# Patient Record
Sex: FEMALE | Race: OTHER | HISPANIC OR LATINO | Employment: UNEMPLOYED | URBAN - METROPOLITAN AREA
[De-identification: names, ages, dates, MRNs, and addresses within clinical notes are randomized per-mention and may not be internally consistent; named-entity substitution may affect disease eponyms.]

---

## 2020-09-18 ENCOUNTER — APPOINTMENT (EMERGENCY)
Dept: RADIOLOGY | Facility: HOSPITAL | Age: 45
End: 2020-09-18

## 2020-09-18 ENCOUNTER — HOSPITAL ENCOUNTER (EMERGENCY)
Facility: HOSPITAL | Age: 45
Discharge: HOME | End: 2020-09-18
Attending: EMERGENCY MEDICINE | Admitting: EMERGENCY MEDICINE

## 2020-09-18 VITALS
RESPIRATION RATE: 14 BRPM | SYSTOLIC BLOOD PRESSURE: 112 MMHG | WEIGHT: 149.25 LBS | TEMPERATURE: 96.6 F | DIASTOLIC BLOOD PRESSURE: 72 MMHG | HEART RATE: 68 BPM | OXYGEN SATURATION: 100 %

## 2020-09-18 DIAGNOSIS — R07.9 CHEST PAIN, UNSPECIFIED TYPE: Primary | ICD-10-CM

## 2020-09-18 DIAGNOSIS — Z76.0 MEDICATION REFILL: ICD-10-CM

## 2020-09-18 DIAGNOSIS — I10 BENIGN ESSENTIAL HTN: ICD-10-CM

## 2020-09-18 DIAGNOSIS — R07.1 CHEST PAIN ON BREATHING: ICD-10-CM

## 2020-09-18 LAB
ALBUMIN SERPL BCP-MCNC: 4.4 G/DL (ref 3–5.2)
ALP SERPL-CCNC: 66 U/L (ref 43–122)
ALT SERPL W P-5'-P-CCNC: 19 U/L (ref 9–52)
ANION GAP SERPL CALCULATED.3IONS-SCNC: 8 MMOL/L (ref 5–14)
AST SERPL W P-5'-P-CCNC: 31 U/L (ref 14–36)
ATRIAL RATE: 75 BPM
BASOPHILS # BLD AUTO: 0.1 THOUSANDS/ΜL (ref 0–0.1)
BASOPHILS NFR BLD AUTO: 1 % (ref 0–1)
BILIRUB SERPL-MCNC: 0.4 MG/DL
BUN SERPL-MCNC: 10 MG/DL (ref 5–25)
CALCIUM SERPL-MCNC: 9.3 MG/DL (ref 8.4–10.2)
CHLORIDE SERPL-SCNC: 104 MMOL/L (ref 97–108)
CO2 SERPL-SCNC: 25 MMOL/L (ref 22–30)
CREAT SERPL-MCNC: 0.59 MG/DL (ref 0.6–1.2)
EOSINOPHIL # BLD AUTO: 0.2 THOUSAND/ΜL (ref 0–0.4)
EOSINOPHIL NFR BLD AUTO: 3 % (ref 0–6)
ERYTHROCYTE [DISTWIDTH] IN BLOOD BY AUTOMATED COUNT: 13.3 %
EXT PREG TEST URINE: NEGATIVE
EXT. CONTROL ED NAV: NORMAL
GFR SERPL CREATININE-BSD FRML MDRD: 113 ML/MIN/1.73SQ M
GLUCOSE SERPL-MCNC: 106 MG/DL (ref 70–99)
HCT VFR BLD AUTO: 40.8 % (ref 36–46)
HGB BLD-MCNC: 13.9 G/DL (ref 12–16)
LIPASE SERPL-CCNC: 71 U/L (ref 23–300)
LYMPHOCYTES # BLD AUTO: 2 THOUSANDS/ΜL (ref 0.5–4)
LYMPHOCYTES NFR BLD AUTO: 29 % (ref 25–45)
MCH RBC QN AUTO: 31.8 PG (ref 26–34)
MCHC RBC AUTO-ENTMCNC: 34.1 G/DL (ref 31–36)
MCV RBC AUTO: 93 FL (ref 80–100)
MONOCYTES # BLD AUTO: 0.6 THOUSAND/ΜL (ref 0.2–0.9)
MONOCYTES NFR BLD AUTO: 8 % (ref 1–10)
NEUTROPHILS # BLD AUTO: 4.2 THOUSANDS/ΜL (ref 1.8–7.8)
NEUTS SEG NFR BLD AUTO: 60 % (ref 45–65)
NT-PROBNP SERPL-MCNC: 30.4 PG/ML (ref 0–299)
P AXIS: 107 DEGREES
PLATELET # BLD AUTO: 303 THOUSANDS/UL (ref 150–450)
PMV BLD AUTO: 8.1 FL (ref 8.9–12.7)
POTASSIUM SERPL-SCNC: 4.2 MMOL/L (ref 3.6–5)
PR INTERVAL: 140 MS
PROT SERPL-MCNC: 7.7 G/DL (ref 5.9–8.4)
QRS AXIS: 116 DEGREES
QRSD INTERVAL: 82 MS
QT INTERVAL: 384 MS
QTC INTERVAL: 428 MS
RBC # BLD AUTO: 4.38 MILLION/UL (ref 4–5.2)
SODIUM SERPL-SCNC: 137 MMOL/L (ref 137–147)
T WAVE AXIS: 153 DEGREES
TROPONIN I SERPL-MCNC: <0.01 NG/ML (ref 0–0.03)
VENTRICULAR RATE: 75 BPM
WBC # BLD AUTO: 7 THOUSAND/UL (ref 4.5–11)

## 2020-09-18 PROCEDURE — 93010 ELECTROCARDIOGRAM REPORT: CPT | Performed by: INTERNAL MEDICINE

## 2020-09-18 PROCEDURE — 99285 EMERGENCY DEPT VISIT HI MDM: CPT | Performed by: EMERGENCY MEDICINE

## 2020-09-18 PROCEDURE — 80053 COMPREHEN METABOLIC PANEL: CPT | Performed by: EMERGENCY MEDICINE

## 2020-09-18 PROCEDURE — 80061 LIPID PANEL: CPT

## 2020-09-18 PROCEDURE — 81025 URINE PREGNANCY TEST: CPT | Performed by: EMERGENCY MEDICINE

## 2020-09-18 PROCEDURE — 84484 ASSAY OF TROPONIN QUANT: CPT | Performed by: EMERGENCY MEDICINE

## 2020-09-18 PROCEDURE — 83690 ASSAY OF LIPASE: CPT | Performed by: EMERGENCY MEDICINE

## 2020-09-18 PROCEDURE — 99285 EMERGENCY DEPT VISIT HI MDM: CPT

## 2020-09-18 PROCEDURE — 71045 X-RAY EXAM CHEST 1 VIEW: CPT

## 2020-09-18 PROCEDURE — 83880 ASSAY OF NATRIURETIC PEPTIDE: CPT | Performed by: EMERGENCY MEDICINE

## 2020-09-18 PROCEDURE — 96360 HYDRATION IV INFUSION INIT: CPT

## 2020-09-18 PROCEDURE — 93005 ELECTROCARDIOGRAM TRACING: CPT

## 2020-09-18 PROCEDURE — 36415 COLL VENOUS BLD VENIPUNCTURE: CPT | Performed by: EMERGENCY MEDICINE

## 2020-09-18 PROCEDURE — 85025 COMPLETE CBC W/AUTO DIFF WBC: CPT | Performed by: EMERGENCY MEDICINE

## 2020-09-18 RX ORDER — ASPIRIN 81 MG/1
324 TABLET, CHEWABLE ORAL ONCE
Status: DISCONTINUED | OUTPATIENT
Start: 2020-09-18 | End: 2020-09-18

## 2020-09-18 RX ORDER — NIFEDIPINE 30 MG/1
30 TABLET, FILM COATED, EXTENDED RELEASE ORAL DAILY
COMMUNITY
End: 2020-09-21 | Stop reason: ALTCHOICE

## 2020-09-18 RX ORDER — METOCLOPRAMIDE 10 MG/1
10 TABLET ORAL 4 TIMES DAILY
COMMUNITY

## 2020-09-18 RX ORDER — OMEPRAZOLE 20 MG/1
20 CAPSULE, DELAYED RELEASE ORAL DAILY
COMMUNITY
End: 2020-09-18 | Stop reason: SDUPTHER

## 2020-09-18 RX ORDER — OMEPRAZOLE 20 MG/1
20 CAPSULE, DELAYED RELEASE ORAL DAILY
Qty: 45 CAPSULE | Refills: 0 | Status: SHIPPED | OUTPATIENT
Start: 2020-09-18

## 2020-09-18 RX ORDER — TELMISARTAN AND HYDROCHLORTHIAZIDE 80; 12.5 MG/1; MG/1
1 TABLET ORAL DAILY
Qty: 14 TABLET | Refills: 0 | Status: SHIPPED | OUTPATIENT
Start: 2020-09-18 | End: 2020-09-30 | Stop reason: SDUPTHER

## 2020-09-18 RX ADMIN — SODIUM CHLORIDE 1000 ML: 0.9 INJECTION, SOLUTION INTRAVENOUS at 11:03

## 2020-09-21 ENCOUNTER — OFFICE VISIT (OUTPATIENT)
Dept: FAMILY MEDICINE CLINIC | Facility: CLINIC | Age: 45
End: 2020-09-21

## 2020-09-21 VITALS
WEIGHT: 148 LBS | HEIGHT: 61 IN | DIASTOLIC BLOOD PRESSURE: 60 MMHG | SYSTOLIC BLOOD PRESSURE: 106 MMHG | RESPIRATION RATE: 16 BRPM | HEART RATE: 75 BPM | BODY MASS INDEX: 27.94 KG/M2 | TEMPERATURE: 97.3 F | OXYGEN SATURATION: 98 %

## 2020-09-21 DIAGNOSIS — I10 BENIGN ESSENTIAL HTN: ICD-10-CM

## 2020-09-21 DIAGNOSIS — Z12.4 CERVICAL CANCER SCREENING: Primary | ICD-10-CM

## 2020-09-21 DIAGNOSIS — R07.1 CHEST PAIN ON BREATHING: ICD-10-CM

## 2020-09-21 DIAGNOSIS — Z78.9 NEED FOR FOLLOW-UP BY SOCIAL WORKER: Primary | ICD-10-CM

## 2020-09-21 DIAGNOSIS — Z72.0 TOBACCO ABUSE: ICD-10-CM

## 2020-09-21 DIAGNOSIS — Z12.39 BREAST CANCER SCREENING: ICD-10-CM

## 2020-09-21 LAB
CHOLEST SERPL-MCNC: 265 MG/DL
HDLC SERPL-MCNC: 41 MG/DL
LDLC SERPL CALC-MCNC: 199 MG/DL
TRIGL SERPL-MCNC: 125 MG/DL

## 2020-09-21 PROCEDURE — 99213 OFFICE O/P EST LOW 20 MIN: CPT | Performed by: INTERNAL MEDICINE

## 2020-09-21 NOTE — ASSESSMENT & PLAN NOTE
Pre contemplative stage  Discussed importance of cessation in light of hypertension and experiencing chest pain    Have advised patient to RTC to discuss interventions to his further supposed cessation

## 2020-09-21 NOTE — PATIENT INSTRUCTIONS
Costocondritis   LO QUE NECESITA SABER:   La costocondritis es neftaly condición que causa dolor en el cartílago que conecta baron costillas a montanez esternón  El cartílago es el tejido lisbeth y flexible que protege a los [employer]  INSTRUCCIONES SOBRE [facility]:   Medicamentos:   · Acetaminofeno:  Rhiannon medicamento disminuye el dolor  El acetaminofeno puede obtener sin receta médica  Pregunte la cantidad y la frecuencia con que debe tomarlos  [address]  El acetaminofén puede causar daño en el hígado cuando no se melanie de forma correcta  · AINEs (Analgésicos antiinflamatorios no esteroides) latanya el ibuprofeno, ayudan a disminuir la inflamación, el dolor y la [place]  Rhiannon medicamento esta disponible con o sin neftaly receta médica  Los AINEs pueden causar sangrado estomacal o problemas renales en ciertas personas  Si usted esta tomando un anticoágulante,  siempre  pregunte si los AINEs son seguros para usted  Siempre yash la etiqueta de rhiannon medicamento y [place] instrucciones  No administre rhiannon medicamento a niños menores de 6 meses de pham sin antes obtener la autorización de montanez médico      · [place] baron medicamentos latanya se le haya indicado  Consulte con montanez médico si usted janes que montanez medicamento no le está ayudando o si presenta efectos secundarios  Infórmele si es alérgico a algún medicamento  Mantenga neftaly lista actualizada de los [place], las vitaminas y los productos herbales que melanie  Incluya los siguientes datos de los medicamentos: cantidad, frecuencia y motivo de administración  Traiga con usted la lista o los envases de la píldoras a baron citas de seguimiento  Lleve la lista de los medicamentos con usted en glory de neftaly emergencia  Acuda a baron consultas de control con montanez médico según le indicaron  Anote baron preguntas para que se acuerde de hacerlas sully baron visitas  Descanse:  Es posible que necesite descansar más   Aprenda cuáles movimientos y [employer] causan dolor y deje de hacerlas  No levante objetos, latanya un bolso o neftaly mochila, si esto le causa dolor  Evite las [employer] y levantar pesas hasta que carmona dolor disminuya o desaparezca  Pregúntele a carmona médico cuáles actividades son [place] para usted mientras se recupera  Calor:  La aplicación de calor facilita la reducción del dolor en algunos pacientes  Aplíquese calor en el área lesionada sully 20 a 30 minutos cada 2 horas sully la cantidad de [employer] indiquen  Hielo:  El hielo ayuda a disminuir la inflamación y el dolor  El hielo también puede contribuir a evitar el daño de los tejidos  Use un paquete de hielo o ponga hielo molido dentro de [employer]  Cúbralo con neftaly toalla y colóquelo en el área adolorida por 15 a 20 minutos cada hora, o latanya se le indique  Ejercicios de estiramiento:  El estiramiento suave podría aliviar baron síntomas  Párese en neftaly gely y ponga baron krystal en el shorty de la gely al nivel de las orejas u Ricardo Simple un paso hacia adelante y estire carmona pecho suavemente  Kayden lo mismo con las krystal a un nivel más alto [place]  Pregúntele a carmona [place] vitaminas y minerales son adecuados para usted  · Usted tiene fiebre  · Las áreas dolorosas en carmona pecho se randa inflamadas, enrojecidas o se sienten calientes al tacto  · Usted no puede dormir a causa del dolor  · Usted tiene preguntas o inquietudes acerca de carmona condición o cuidado  © 2017 [address] Information is for End User's use only and may not be sold, redistributed or otherwise used for commercial purposes  All illustrations and images included in CareNotes® are the copyrighted property of A D A M , Inc  or Jason Keith  Esta información es sólo para uso en educación  [place] intención no es darle un consejo médico sobre enfermedades o tratamientos   Colsulte con carmona [employer] farmac[employer]ut[employer] antes de seguir cualquier régimen médico para saber si es seguro y [place] para usted

## 2020-09-21 NOTE — PROGRESS NOTES
Assessment/Plan:    Benign essential HTN  continue with current medication:  Micardis, 80-12 5 mg daily  Refill has been provided  Chest pain on breathing  EKG reviewed:  Normal sinus rhythm, heart rate 75 beats per minute  Right axis deviation, QTC within normal limits  No ST T wave changes noted in precordial leads  No evidence of right or left left bundle branch block  Most likely musculoskeletal in origin, and have advised scheduled Tylenol and ibuprofen q 8 hours the next 1-2 days, with heat applied to the affected area as well as stretching b i d     If no improvement experience in 1 week please contact office    Tobacco abuse  Pre contemplative stage  Discussed importance of cessation in light of hypertension and experiencing chest pain  Have advised patient to RTC to discuss interventions to his further supposed cessation       Diagnoses and all orders for this visit:    Cervical cancer screening    Breast cancer screening  -     Mammo screening bilateral w 3d & cad; Future    Benign essential HTN  -     Lipid Panel with Direct LDL reflex; Future    Chest pain on breathing  -     TSH, 3rd generation with Free T4 reflex; Future  -     Lipid Panel with Direct LDL reflex; Future    Tobacco abuse    Other orders  -     Cancel: Ambulatory referral to Obstetrics / Gynecology; Future          Subjective:      Patient ID: Marlo Abebe is a 38 y o  female  CC:  CHEST PAIN  Marlo Abebe is a 38 y o  female, presenting to the clinic since ED visit due to chest pain  Patient presented initially to the ED on 09/18 due to sharp chest pain occurring at the left sternal border without radiation  Patient states that this pain has been ongoing for the last 2 weeks and occurs both at rest exertion has it does not matter what I am doing I have the pain   She states the pain feels like a dhruv thorn which is exacerbated by respiration and movement    She has tried Tylenol with minimal relief currently what works is lying down and taking garlic supplements with water  In the ED she received a full cardiac workup including EKG, troponin, and pro BN P all of which were within normal limits  PMH:  Hypertension, essential   Diagnosed 2 years prior  The following portions of the patient's history were reviewed and updated as appropriate: She  has a past medical history of Hypertension  Patient Active Problem List    Diagnosis Date Noted    Benign essential HTN 09/21/2020    Chest pain on breathing 09/21/2020    Tobacco abuse 09/21/2020     She  has no past surgical history on file  Her family history is not on file  She  reports that she has been smoking  She has been smoking about 0 50 packs per day  She has never used smokeless tobacco  She reports previous alcohol use  She reports previous drug use  Current Outpatient Medications   Medication Sig Dispense Refill    omeprazole (PriLOSEC) 20 mg delayed release capsule Take 1 capsule (20 mg total) by mouth daily 45 capsule 0    telmisartan-hydrochlorothiazide (MICARDIS HCT) 80-12 5 MG per tablet Take 1 tablet by mouth daily 14 tablet 0    lactulose (CEPHULAC) 20 g packet Take 20 g by mouth 3 (three) times a day      metoclopramide (REGLAN) 10 mg tablet Take 10 mg by mouth 4 (four) times a day       No current facility-administered medications for this visit        Current Outpatient Medications on File Prior to Visit   Medication Sig    omeprazole (PriLOSEC) 20 mg delayed release capsule Take 1 capsule (20 mg total) by mouth daily    telmisartan-hydrochlorothiazide (MICARDIS HCT) 80-12 5 MG per tablet Take 1 tablet by mouth daily    lactulose (CEPHULAC) 20 g packet Take 20 g by mouth 3 (three) times a day    metoclopramide (REGLAN) 10 mg tablet Take 10 mg by mouth 4 (four) times a day    [DISCONTINUED] NIFEdipine ER (ADALAT CC) 30 MG 24 hr tablet Take 30 mg by mouth daily     No current facility-administered medications on file prior to visit  She is allergic to aspirin       Review of Systems   Constitutional: Negative for activity change, appetite change, chills, diaphoresis and fever  Respiratory: Negative for cough and shortness of breath  Cardiovascular: Positive for chest pain  Negative for palpitations and leg swelling  Gastrointestinal: Negative for abdominal pain, diarrhea, nausea and vomiting  Genitourinary: Negative for dysuria  Musculoskeletal: Negative for arthralgias  Neurological: Negative for dizziness, light-headedness and headaches  Psychiatric/Behavioral: Negative for suicidal ideas  Objective:      /60 (BP Location: Left arm, Patient Position: Sitting, Cuff Size: Standard)   Pulse 75   Temp (!) 97 3 °F (36 3 °C) (Temporal)   Resp 16   Ht 5' 1" (1 549 m)   Wt 67 1 kg (148 lb)   LMP 09/20/2020   SpO2 98%   Breastfeeding No   BMI 27 96 kg/m²          Physical Exam  Vitals signs reviewed  Constitutional:       General: She is not in acute distress  Appearance: She is well-developed  Eyes:      General: No scleral icterus  Conjunctiva/sclera: Conjunctivae normal    Neck:      Musculoskeletal: Normal range of motion and neck supple  Cardiovascular:      Rate and Rhythm: Normal rate and regular rhythm  Heart sounds: Normal heart sounds  No murmur  No friction rub  Pulmonary:      Effort: Pulmonary effort is normal  No respiratory distress  Breath sounds: Normal breath sounds  No wheezing  Musculoskeletal: Normal range of motion  Right lower leg: No edema  Left lower leg: No edema  Skin:     General: Skin is warm  Findings: No rash  Neurological:      Mental Status: She is alert and oriented to person, place, and time

## 2020-09-21 NOTE — ASSESSMENT & PLAN NOTE
EKG reviewed:  Normal sinus rhythm, heart rate 75 beats per minute  Right axis deviation, QTC within normal limits  No ST T wave changes noted in precordial leads  No evidence of right or left left bundle branch block    Most likely musculoskeletal in origin, and have advised scheduled Tylenol and ibuprofen q 8 hours the next 1-2 days, with heat applied to the affected area as well as stretching b i d     If no improvement experience in 1 week please contact office

## 2020-09-30 DIAGNOSIS — Z76.0 MEDICATION REFILL: ICD-10-CM

## 2020-10-04 RX ORDER — TELMISARTAN AND HYDROCHLORTHIAZIDE 80; 12.5 MG/1; MG/1
1 TABLET ORAL DAILY
Qty: 30 TABLET | Refills: 0 | Status: SHIPPED | OUTPATIENT
Start: 2020-10-04 | End: 2020-10-12 | Stop reason: CLARIF

## 2020-10-07 ENCOUNTER — PATIENT OUTREACH (OUTPATIENT)
Dept: FAMILY MEDICINE CLINIC | Facility: CLINIC | Age: 45
End: 2020-10-07

## 2020-10-09 ENCOUNTER — PATIENT OUTREACH (OUTPATIENT)
Dept: FAMILY MEDICINE CLINIC | Facility: CLINIC | Age: 45
End: 2020-10-09

## 2020-10-12 DIAGNOSIS — I10 BENIGN ESSENTIAL HTN: Primary | ICD-10-CM

## 2020-10-12 RX ORDER — LOSARTAN POTASSIUM 50 MG/1
50 TABLET ORAL DAILY
Qty: 30 TABLET | Refills: 0 | Status: SHIPPED | OUTPATIENT
Start: 2020-10-12 | End: 2020-10-20 | Stop reason: SINTOL

## 2020-10-12 RX ORDER — HYDROCHLOROTHIAZIDE 12.5 MG/1
12.5 TABLET ORAL DAILY
Qty: 30 TABLET | Refills: 0 | Status: SHIPPED | OUTPATIENT
Start: 2020-10-12 | End: 2020-10-20 | Stop reason: SDUPTHER

## 2020-10-20 ENCOUNTER — TELEPHONE (OUTPATIENT)
Dept: FAMILY MEDICINE CLINIC | Facility: CLINIC | Age: 45
End: 2020-10-20

## 2020-10-20 ENCOUNTER — OFFICE VISIT (OUTPATIENT)
Dept: FAMILY MEDICINE CLINIC | Facility: CLINIC | Age: 45
End: 2020-10-20

## 2020-10-20 VITALS
RESPIRATION RATE: 16 BRPM | TEMPERATURE: 98 F | BODY MASS INDEX: 28.89 KG/M2 | OXYGEN SATURATION: 98 % | DIASTOLIC BLOOD PRESSURE: 76 MMHG | HEART RATE: 76 BPM | SYSTOLIC BLOOD PRESSURE: 112 MMHG | WEIGHT: 153 LBS | HEIGHT: 61 IN

## 2020-10-20 DIAGNOSIS — I10 BENIGN ESSENTIAL HTN: Primary | ICD-10-CM

## 2020-10-20 DIAGNOSIS — R51.9 ACUTE NONINTRACTABLE HEADACHE, UNSPECIFIED HEADACHE TYPE: ICD-10-CM

## 2020-10-20 PROCEDURE — 99213 OFFICE O/P EST LOW 20 MIN: CPT | Performed by: PHYSICIAN ASSISTANT

## 2020-10-20 RX ORDER — LISINOPRIL AND HYDROCHLOROTHIAZIDE 20; 12.5 MG/1; MG/1
1 TABLET ORAL DAILY
Qty: 90 TABLET | Refills: 0 | Status: SHIPPED | OUTPATIENT
Start: 2020-10-20

## 2020-12-08 ENCOUNTER — TELEPHONE (OUTPATIENT)
Dept: FAMILY MEDICINE CLINIC | Facility: CLINIC | Age: 45
End: 2020-12-08

## 2020-12-10 ENCOUNTER — TELEMEDICINE (OUTPATIENT)
Dept: FAMILY MEDICINE CLINIC | Facility: CLINIC | Age: 45
End: 2020-12-10

## 2020-12-10 DIAGNOSIS — R51.9 SINUS HEADACHE: ICD-10-CM

## 2020-12-10 DIAGNOSIS — Z23 ENCOUNTER FOR IMMUNIZATION: Primary | ICD-10-CM

## 2020-12-10 DIAGNOSIS — I10 BENIGN ESSENTIAL HTN: ICD-10-CM

## 2020-12-10 PROCEDURE — 99213 OFFICE O/P EST LOW 20 MIN: CPT | Performed by: FAMILY MEDICINE
